# Patient Record
Sex: FEMALE | Race: WHITE | HISPANIC OR LATINO | Employment: UNEMPLOYED | URBAN - METROPOLITAN AREA
[De-identification: names, ages, dates, MRNs, and addresses within clinical notes are randomized per-mention and may not be internally consistent; named-entity substitution may affect disease eponyms.]

---

## 2019-07-01 ENCOUNTER — HOSPITAL ENCOUNTER (OUTPATIENT)
Facility: HOSPITAL | Age: 39
Discharge: HOME | End: 2019-07-02
Attending: EMERGENCY MEDICINE | Admitting: INTERNAL MEDICINE

## 2019-07-01 DIAGNOSIS — E66.9 OBESITY (BMI 30-39.9): ICD-10-CM

## 2019-07-01 DIAGNOSIS — K81.9 CHOLECYSTITIS: ICD-10-CM

## 2019-07-01 DIAGNOSIS — D72.829 LEUKOCYTOSIS, UNSPECIFIED TYPE: Primary | ICD-10-CM

## 2019-07-01 DIAGNOSIS — R11.10 VOMITING: ICD-10-CM

## 2019-07-01 LAB
ALBUMIN SERPL BCP-MCNC: 4.4 G/DL (ref 3.5–5.2)
ALP SERPL-CCNC: 112 U/L (ref 55–135)
ALT SERPL W/O P-5'-P-CCNC: 32 U/L (ref 10–44)
AMPHET+METHAMPHET UR QL: NEGATIVE
ANION GAP SERPL CALC-SCNC: 14 MMOL/L (ref 8–16)
AST SERPL-CCNC: 21 U/L (ref 10–40)
B-HCG UR QL: NEGATIVE
BACTERIA #/AREA URNS HPF: ABNORMAL /HPF
BARBITURATES UR QL SCN>200 NG/ML: NEGATIVE
BASOPHILS # BLD AUTO: 0.02 K/UL (ref 0–0.2)
BASOPHILS NFR BLD: 0.1 % (ref 0–1.9)
BENZODIAZ UR QL SCN>200 NG/ML: NEGATIVE
BILIRUB SERPL-MCNC: 0.3 MG/DL (ref 0.1–1)
BILIRUB UR QL STRIP: NEGATIVE
BUN SERPL-MCNC: 15 MG/DL (ref 6–20)
BZE UR QL SCN: NEGATIVE
CALCIUM SERPL-MCNC: 9.5 MG/DL (ref 8.7–10.5)
CANNABINOIDS UR QL SCN: NEGATIVE
CHLORIDE SERPL-SCNC: 108 MMOL/L (ref 95–110)
CLARITY UR: ABNORMAL
CO2 SERPL-SCNC: 20 MMOL/L (ref 23–29)
COLOR UR: ABNORMAL
CREAT SERPL-MCNC: 0.8 MG/DL (ref 0.5–1.4)
CREAT UR-MCNC: 371.3 MG/DL (ref 15–325)
CTP QC/QA: YES
DIFFERENTIAL METHOD: ABNORMAL
EOSINOPHIL # BLD AUTO: 0 K/UL (ref 0–0.5)
EOSINOPHIL NFR BLD: 0.1 % (ref 0–8)
ERYTHROCYTE [DISTWIDTH] IN BLOOD BY AUTOMATED COUNT: 13.8 % (ref 11.5–14.5)
EST. GFR  (AFRICAN AMERICAN): >60 ML/MIN/1.73 M^2
EST. GFR  (NON AFRICAN AMERICAN): >60 ML/MIN/1.73 M^2
GLUCOSE SERPL-MCNC: 125 MG/DL (ref 70–110)
GLUCOSE UR QL STRIP: NEGATIVE
HCT VFR BLD AUTO: 45.3 % (ref 37–48.5)
HGB BLD-MCNC: 14.7 G/DL (ref 12–16)
HGB UR QL STRIP: ABNORMAL
HYALINE CASTS #/AREA URNS LPF: 0 /LPF
KETONES UR QL STRIP: ABNORMAL
LEUKOCYTE ESTERASE UR QL STRIP: ABNORMAL
LIPASE SERPL-CCNC: 25 U/L (ref 4–60)
LYMPHOCYTES # BLD AUTO: 1.3 K/UL (ref 1–4.8)
LYMPHOCYTES NFR BLD: 5.2 % (ref 18–48)
MCH RBC QN AUTO: 28.4 PG (ref 27–31)
MCHC RBC AUTO-ENTMCNC: 32.5 G/DL (ref 32–36)
MCV RBC AUTO: 88 FL (ref 82–98)
METHADONE UR QL SCN>300 NG/ML: NEGATIVE
MICROSCOPIC COMMENT: ABNORMAL
MONOCYTES # BLD AUTO: 2.4 K/UL (ref 0.3–1)
MONOCYTES NFR BLD: 9.3 % (ref 4–15)
NEUTROPHILS # BLD AUTO: 21.6 K/UL (ref 1.8–7.7)
NEUTROPHILS NFR BLD: 85.9 % (ref 38–73)
NITRITE UR QL STRIP: NEGATIVE
OPIATES UR QL SCN: NEGATIVE
PCP UR QL SCN>25 NG/ML: NEGATIVE
PH UR STRIP: 5 [PH] (ref 5–8)
PLATELET # BLD AUTO: 423 K/UL (ref 150–350)
PMV BLD AUTO: 10 FL (ref 9.2–12.9)
POTASSIUM SERPL-SCNC: 3.4 MMOL/L (ref 3.5–5.1)
PROT SERPL-MCNC: 8.6 G/DL (ref 6–8.4)
PROT UR QL STRIP: ABNORMAL
RBC # BLD AUTO: 5.17 M/UL (ref 4–5.4)
RBC #/AREA URNS HPF: 3 /HPF (ref 0–4)
SODIUM SERPL-SCNC: 142 MMOL/L (ref 136–145)
SP GR UR STRIP: >1.03 (ref 1–1.03)
SQUAMOUS #/AREA URNS HPF: 10 /HPF
TOXICOLOGY INFORMATION: ABNORMAL
URN SPEC COLLECT METH UR: ABNORMAL
UROBILINOGEN UR STRIP-ACNC: NEGATIVE EU/DL
WBC # BLD AUTO: 25.36 K/UL (ref 3.9–12.7)
WBC #/AREA URNS HPF: 4 /HPF (ref 0–5)

## 2019-07-01 PROCEDURE — 83690 ASSAY OF LIPASE: CPT

## 2019-07-01 PROCEDURE — 25000003 PHARM REV CODE 250: Performed by: EMERGENCY MEDICINE

## 2019-07-01 PROCEDURE — 80053 COMPREHEN METABOLIC PANEL: CPT

## 2019-07-01 PROCEDURE — 81000 URINALYSIS NONAUTO W/SCOPE: CPT | Mod: 59

## 2019-07-01 PROCEDURE — 85025 COMPLETE CBC W/AUTO DIFF WBC: CPT

## 2019-07-01 PROCEDURE — 63600175 PHARM REV CODE 636 W HCPCS: Performed by: EMERGENCY MEDICINE

## 2019-07-01 PROCEDURE — 99285 EMERGENCY DEPT VISIT HI MDM: CPT | Mod: 25

## 2019-07-01 PROCEDURE — 96375 TX/PRO/DX INJ NEW DRUG ADDON: CPT

## 2019-07-01 PROCEDURE — 96365 THER/PROPH/DIAG IV INF INIT: CPT

## 2019-07-01 PROCEDURE — G0378 HOSPITAL OBSERVATION PER HR: HCPCS

## 2019-07-01 PROCEDURE — 96361 HYDRATE IV INFUSION ADD-ON: CPT

## 2019-07-01 PROCEDURE — S0028 INJECTION, FAMOTIDINE, 20 MG: HCPCS | Performed by: EMERGENCY MEDICINE

## 2019-07-01 PROCEDURE — 81025 URINE PREGNANCY TEST: CPT | Performed by: EMERGENCY MEDICINE

## 2019-07-01 PROCEDURE — 80307 DRUG TEST PRSMV CHEM ANLYZR: CPT

## 2019-07-01 RX ORDER — METOCLOPRAMIDE HYDROCHLORIDE 5 MG/ML
10 INJECTION INTRAMUSCULAR; INTRAVENOUS
Status: COMPLETED | OUTPATIENT
Start: 2019-07-01 | End: 2019-07-01

## 2019-07-01 RX ORDER — CIPROFLOXACIN 2 MG/ML
400 INJECTION, SOLUTION INTRAVENOUS
Status: DISCONTINUED | OUTPATIENT
Start: 2019-07-01 | End: 2019-07-02 | Stop reason: HOSPADM

## 2019-07-01 RX ORDER — MORPHINE SULFATE 10 MG/ML
4 INJECTION INTRAMUSCULAR; INTRAVENOUS; SUBCUTANEOUS EVERY 6 HOURS PRN
Status: DISCONTINUED | OUTPATIENT
Start: 2019-07-01 | End: 2019-07-02 | Stop reason: HOSPADM

## 2019-07-01 RX ORDER — FAMOTIDINE 10 MG/ML
20 INJECTION INTRAVENOUS
Status: COMPLETED | OUTPATIENT
Start: 2019-07-01 | End: 2019-07-01

## 2019-07-01 RX ORDER — METRONIDAZOLE 500 MG/100ML
500 INJECTION, SOLUTION INTRAVENOUS
Status: DISCONTINUED | OUTPATIENT
Start: 2019-07-01 | End: 2019-07-02 | Stop reason: HOSPADM

## 2019-07-01 RX ORDER — DIPHENHYDRAMINE HYDROCHLORIDE 50 MG/ML
50 INJECTION INTRAMUSCULAR; INTRAVENOUS
Status: COMPLETED | OUTPATIENT
Start: 2019-07-01 | End: 2019-07-01

## 2019-07-01 RX ORDER — SODIUM CHLORIDE 9 MG/ML
1000 INJECTION, SOLUTION INTRAVENOUS
Status: COMPLETED | OUTPATIENT
Start: 2019-07-01 | End: 2019-07-01

## 2019-07-01 RX ORDER — MORPHINE SULFATE 10 MG/ML
4 INJECTION INTRAMUSCULAR; INTRAVENOUS; SUBCUTANEOUS
Status: COMPLETED | OUTPATIENT
Start: 2019-07-01 | End: 2019-07-01

## 2019-07-01 RX ADMIN — FAMOTIDINE 20 MG: 10 INJECTION INTRAVENOUS at 10:07

## 2019-07-01 RX ADMIN — MORPHINE SULFATE 4 MG: 10 INJECTION INTRAVENOUS at 09:07

## 2019-07-01 RX ADMIN — DIPHENHYDRAMINE HYDROCHLORIDE 50 MG: 50 INJECTION INTRAMUSCULAR; INTRAVENOUS at 10:07

## 2019-07-01 RX ADMIN — PIPERACILLIN AND TAZOBACTAM 4.5 G: 4; .5 INJECTION, POWDER, LYOPHILIZED, FOR SOLUTION INTRAVENOUS; PARENTERAL at 09:07

## 2019-07-01 RX ADMIN — SODIUM CHLORIDE 1000 ML: 0.9 INJECTION, SOLUTION INTRAVENOUS at 09:07

## 2019-07-01 RX ADMIN — METOCLOPRAMIDE 10 MG: 5 INJECTION, SOLUTION INTRAMUSCULAR; INTRAVENOUS at 09:07

## 2019-07-01 RX ADMIN — SODIUM CHLORIDE 1000 ML: 0.9 INJECTION, SOLUTION INTRAVENOUS at 10:07

## 2019-07-01 RX ADMIN — CIPROFLOXACIN 400 MG: 2 INJECTION, SOLUTION INTRAVENOUS at 10:07

## 2019-07-02 VITALS
DIASTOLIC BLOOD PRESSURE: 71 MMHG | BODY MASS INDEX: 34.66 KG/M2 | TEMPERATURE: 98 F | HEIGHT: 60 IN | HEART RATE: 79 BPM | OXYGEN SATURATION: 98 % | SYSTOLIC BLOOD PRESSURE: 143 MMHG | WEIGHT: 176.56 LBS | RESPIRATION RATE: 18 BRPM

## 2019-07-02 PROBLEM — E66.9 OBESITY (BMI 30-39.9): Status: ACTIVE | Noted: 2019-07-02

## 2019-07-02 PROBLEM — D72.829 LEUKOCYTOSIS: Status: ACTIVE | Noted: 2019-07-02

## 2019-07-02 LAB
BASOPHILS # BLD AUTO: 0.01 K/UL (ref 0–0.2)
BASOPHILS NFR BLD: 0.1 % (ref 0–1.9)
DIFFERENTIAL METHOD: ABNORMAL
EOSINOPHIL # BLD AUTO: 0.1 K/UL (ref 0–0.5)
EOSINOPHIL NFR BLD: 0.6 % (ref 0–8)
ERYTHROCYTE [DISTWIDTH] IN BLOOD BY AUTOMATED COUNT: 14 % (ref 11.5–14.5)
HCT VFR BLD AUTO: 39.5 % (ref 37–48.5)
HGB BLD-MCNC: 12.6 G/DL (ref 12–16)
LYMPHOCYTES # BLD AUTO: 3 K/UL (ref 1–4.8)
LYMPHOCYTES NFR BLD: 25.3 % (ref 18–48)
MCH RBC QN AUTO: 28.1 PG (ref 27–31)
MCHC RBC AUTO-ENTMCNC: 31.9 G/DL (ref 32–36)
MCV RBC AUTO: 88 FL (ref 82–98)
MONOCYTES # BLD AUTO: 0.8 K/UL (ref 0.3–1)
MONOCYTES NFR BLD: 6.5 % (ref 4–15)
NEUTROPHILS # BLD AUTO: 7.9 K/UL (ref 1.8–7.7)
NEUTROPHILS NFR BLD: 67.8 % (ref 38–73)
PLATELET # BLD AUTO: 369 K/UL (ref 150–350)
PMV BLD AUTO: 9.8 FL (ref 9.2–12.9)
RBC # BLD AUTO: 4.48 M/UL (ref 4–5.4)
WBC # BLD AUTO: 11.66 K/UL (ref 3.9–12.7)

## 2019-07-02 PROCEDURE — 25000003 PHARM REV CODE 250: Performed by: EMERGENCY MEDICINE

## 2019-07-02 PROCEDURE — 94761 N-INVAS EAR/PLS OXIMETRY MLT: CPT

## 2019-07-02 PROCEDURE — 36415 COLL VENOUS BLD VENIPUNCTURE: CPT

## 2019-07-02 PROCEDURE — 85025 COMPLETE CBC W/AUTO DIFF WBC: CPT

## 2019-07-02 PROCEDURE — S0030 INJECTION, METRONIDAZOLE: HCPCS | Performed by: EMERGENCY MEDICINE

## 2019-07-02 PROCEDURE — G0378 HOSPITAL OBSERVATION PER HR: HCPCS

## 2019-07-02 PROCEDURE — 96375 TX/PRO/DX INJ NEW DRUG ADDON: CPT

## 2019-07-02 PROCEDURE — 63600175 PHARM REV CODE 636 W HCPCS: Performed by: EMERGENCY MEDICINE

## 2019-07-02 PROCEDURE — 96376 TX/PRO/DX INJ SAME DRUG ADON: CPT

## 2019-07-02 RX ORDER — METRONIDAZOLE 500 MG/1
500 TABLET ORAL EVERY 12 HOURS
Qty: 14 TABLET | Refills: 0 | Status: SHIPPED | OUTPATIENT
Start: 2019-07-02 | End: 2019-07-09

## 2019-07-02 RX ORDER — SODIUM CHLORIDE 0.9 % (FLUSH) 0.9 %
10 SYRINGE (ML) INJECTION
Status: DISCONTINUED | OUTPATIENT
Start: 2019-07-02 | End: 2019-07-02 | Stop reason: HOSPADM

## 2019-07-02 RX ADMIN — CIPROFLOXACIN 400 MG: 2 INJECTION, SOLUTION INTRAVENOUS at 11:07

## 2019-07-02 RX ADMIN — METRONIDAZOLE 500 MG: 500 INJECTION, SOLUTION INTRAVENOUS at 12:07

## 2019-07-02 RX ADMIN — METRONIDAZOLE 500 MG: 500 INJECTION, SOLUTION INTRAVENOUS at 08:07

## 2019-07-02 NOTE — ASSESSMENT & PLAN NOTE
Unclear etiology, infectious vs reactive from [place] - patient denies hemoptysis or hemetemesis - she is afebrile and feeling better this morning;   -recheck her CBC  -continue IV fluids

## 2019-07-02 NOTE — H&P
[facility] Medicine  History & Physical    Patient Name: Britany Stringer  MRN: [id number]  Admission Date: 7/1/2019  Attending Physician: Kami Ly MD   Primary Care Provider: Primary Doctor No         Patient information was obtained from patient and ER records.     Subjective:     Principal Problem:Cholecystitis    Chief Complaint:   Chief Complaint   Patient presents with    Abdominal Pain     pt complains of abd pain since 1600 hrs today. states has vomited about 12 times. states pain is epigastric and throat        HPI: Britany Stringer is a 39 y.o. with no past medical history who presented for evaluation of acute onset of sharp cramping abdominal pain 10/10 and >15 episodes of non-bloody emesis which began about 4p prior to admit. She does report mild diarrhea but reports resolve. She denies any similar episodes in the past. She states that her last meal was tortillia. Patient denies recent trauma, cp, fever, HA. Patient speaks Romansh and interview held using .    Initial workup showed elevated white count 25K, hypokalemia (mild), US abdomen showed Gallbladder impacted with stones with pericholecystic fluid and gallbladder wall thickening.     History reviewed. No pertinent past medical history.    History reviewed. No pertinent surgical history.    Review of patient's allergies indicates:   Allergen Reactions    Zosyn [piperacillin-tazobactam] Hives and Itching       No current facility-administered medications on file prior to encounter.      No current outpatient medications on file prior to encounter.     Family History     None        Tobacco Use    Smoking status: Never Smoker    Smokeless tobacco: Never Used   Substance and Sexual Activity    Alcohol use: Not Currently    Drug use: Never    Sexual activity: Not on file     Review of Systems   Constitutional: Negative for appetite change, chills, diaphoresis and fever.   HENT: Negative for congestion,  hearing loss, sore throat, tinnitus and trouble swallowing.    Eyes: Negative for photophobia, discharge, itching and visual disturbance.   Respiratory: Negative for apnea, cough, wheezing and stridor.    Cardiovascular: Negative for chest pain, palpitations and leg swelling.   Gastrointestinal: Positive for abdominal pain, diarrhea and nausea. Negative for abdominal distention, blood in stool and constipation.   Endocrine: Negative for polydipsia, polyphagia and polyuria.   Genitourinary: Negative for difficulty urinating, dysuria, flank pain and frequency.   Musculoskeletal: Negative for arthralgias, joint swelling and neck stiffness.   Skin: Negative for color change, rash and wound.   Neurological: Negative for dizziness, tremors, seizures, light-headedness, numbness and headaches.   Hematological: Negative for adenopathy.   Psychiatric/Behavioral: Negative for hallucinations and self-injury.     Objective:     Vital Signs (Most Recent):  Temp: 98.1 °F (36.7 °C) (07/02/19 0721)  Pulse: 81 (07/02/19 0721)  Resp: 18 (07/02/19 0721)  BP: 129/71 (07/02/19 0721)  SpO2: 98 % (07/02/19 0721) Vital Signs (24h Range):  Temp:  [97.8 °F (36.6 °C)-98.7 °F (37.1 °C)] 98.1 °F (36.7 °C)  Pulse:  [] 81  Resp:  [18] 18  SpO2:  [98 %-100 %] 98 %  BP: (126-169)/() 129/71     Weight: 80.1 kg (176 lb 9.4 oz)  Body mass index is 34.49 kg/m².    Physical Exam   Constitutional: She appears well-developed and well-nourished. She is cooperative.   HENT:   Head: Normocephalic and atraumatic.   Eyes: Conjunctivae and lids are normal.   Neck: Full passive range of motion without pain. Neck supple. No JVD present. No edema present. No thyroid mass present.   Cardiovascular: S1 normal, S2 normal and intact distal pulses.   No murmur heard.  Abdominal: Soft. Bowel sounds are normal. She exhibits no distension and no abdominal bruit. There is no splenomegaly or hepatomegaly. There is no tenderness. There is no CVA tenderness.    Lymphadenopathy:     She has no cervical adenopathy.     She has no axillary adenopathy.   Neurological: She is alert. She has normal reflexes. She displays no tremor. She displays no seizure activity.   Skin: Skin is warm, dry and intact.   Psychiatric: She has a normal mood and affect. Her speech is normal. Thought content normal. Cognition and memory are normal.           Significant Labs:   CBC:   Recent Labs   Lab 07/01/19 2057   WBC 25.36*   HGB 14.7   HCT 45.3   *     CMP:   Recent Labs   Lab 07/01/19 2057      K 3.4*      CO2 20*   *   BUN 15   CREATININE 0.8   CALCIUM 9.5   PROT 8.6*   ALBUMIN 4.4   BILITOT 0.3   ALKPHOS 112   AST 21   ALT 32   ANIONGAP 14   EGFRNONAA >60     Cardiac Markers: No results for input(s): CKMB, MYOGLOBIN, BNP, TROPISTAT in the last 48 hours.  Coagulation: No results for input(s): PT, INR, APTT in the last 48 hours.  Lactic Acid: No results for input(s): LACTATE in the last 48 hours.  Lipase:   Recent Labs   Lab 07/01/19 2057   LIPASE 25     Lipid Panel: No results for input(s): CHOL, HDL, LDLCALC, TRIG, CHOLHDL in the last 48 hours.  Magnesium: No results for input(s): MG in the last 48 hours.  Pathology Results  (Last 10 years)    None        Troponin: No results for input(s): TROPONINI in the last 48 hours.  TSH: No results for input(s): TSH in the last 4320 hours.  Urine Culture: No results for input(s): LABURIN in the last 48 hours.  Urine Studies:   Recent Labs   Lab 07/01/19  2100   COLORU Karissa   APPEARANCEUA Cloudy*   PHUR 5.0   SPECGRAV >1.030*   PROTEINUA 2+*   GLUCUA Negative   KETONESU Trace*   BILIRUBINUA Negative   OCCULTUA 1+*   NITRITE Negative   UROBILINOGEN Negative   LEUKOCYTESUR 1+*   RBCUA 3   WBCUA 4   BACTERIA Moderate*   SQUAMEPITHEL 10   HYALINECASTS 0       Significant Imaging:   Imaging Results          X-Ray Chest AP Portable (Final result)  Result time 07/01/19 22:21:02    Final result by Jillian Davies MD  (07/01/19 22:21:02)                 Impression:      No acute intrathoracic abnormality detected.      Electronically signed by: Jillian Davies  Date:    07/01/2019  Time:    22:21             Narrative:    EXAMINATION:  AP PORTABLE CHEST    CLINICAL HISTORY:  Vomiting, unspecified    TECHNIQUE:  AP portable chest radiograph was submitted.    COMPARISON:  None.    FINDINGS:  AP portable chest radiograph demonstrates a cardiac silhouette within normal limits.  There is no focal consolidation, pneumothorax, or pleural effusion.                                US Abdomen Limited (Final result)  Result time 07/01/19 21:55:13    Final result by Mukesh Ribeiro MD (07/01/19 21:55:13)                 Impression:      Gallbladder impacted with stones with pericholecystic fluid and gallbladder wall thickening.  Negative sonographic Bergeron's sign.  The findings are equivocal for acute cholecystitis. A HIDA scan may be obtained for further evaluation.    This report was flagged in Epic as abnormal.      Electronically signed by: Mukesh Ribeiro MD  Date:    07/01/2019  Time:    21:55             Narrative:    EXAMINATION:  US ABDOMEN LIMITED    CLINICAL HISTORY:  Epigastric pain and vomiting;    TECHNIQUE:  Limited ultrasound of the right upper quadrant of the abdomen (including pancreas, liver, gallbladder, common bile duct, and spleen) was performed.    COMPARISON:  None.    FINDINGS:  The pancreas is not visualized due to overlying bowel gas.    The gallbladder is impacted with multiple gallstones.  The gallbladder wall is thickened measuring 6 mm.  There is small amount of pericholecystic fluid.  Sonographic Bergeron's sign is negative.  There is no evidence of gallbladder wall hyperemia.    The CBD is within normal limits measuring 2.5 mm.  There is no evidence of intrahepatic biliary dilatation.    The visualized portions of the right kidney are unremarkable.                                  Assessment/Plan:     *  Cholecystitis  Abdominal pain with US shows pericholecystic fluid and gallbladder wall thickening and gallbladder impacted with stones. Surgery was consulted - Hepatic panel promising, afebrile --  White count at the time of admission elevated at 21K---r Awaiting surgery opinion  -HIDA pending  -surgery following  -if HIDA shows no cholecystitis will attempt clears and advance diet    Leukocytosis  Unclear etiology, infectious vs reactive from [place] - patient denies hemoptysis or hemetemesis - she is afebrile and feeling better this morning;   -recheck her CBC  -continue IV fluids      Obesity (BMI 30-39.9)  Risk factor for gallbladder disease - encourage lifestyle changes  Body mass index is 34.49 kg/m².  Once diet advanced will instruct 1800 ADA        VTE Risk Mitigation (From admission, onward)        Ordered     IP VTE HIGH RISK PATIENT  Once      07/02/19 0049     Place ASHLEE hose  Until discontinued      07/02/19 0049     Place sequential compression device  Until discontinued      07/02/19 0049           MOSHE Pizarro, FNP-C  Hospitalist - Department of Hospital Medicine  [address] [place] [address]  Office [phone]; Pager [phone]

## 2019-07-02 NOTE — CONSULTS
[facility] History and Physicial   07/02/2019 11:45 AM     Britany Stringer  [id number]    CC/Reason for Consultation: possible cholecystitis    HPI:  39 y.o. female with no significant history who presented to the ED yesterday with complains of abdominal pain. Patient reports pain started around 4 pm yesterday, was epigastric and radiated up her chest. She reports nausea and associated emesis, no hematemesis noted. She received a pill en route to the ED and her pain resolved. She reports no further pain since admission. She has never had this pain before. Denies fevers, chills.     History reviewed. No pertinent past medical history.    History reviewed. No pertinent surgical history.    Social History     Socioeconomic History    Marital status: Single     Spouse name: Not on file    Number of children: Not on file    Years of education: Not on file    Highest education level: Not on file   Occupational History    Not on file   Social Needs    Financial resource strain: Not on file    Food insecurity:     Worry: Not on file     Inability: Not on file    Transportation needs:     Medical: Not on file     Non-medical: Not on file   Tobacco Use    Smoking status: Never Smoker    Smokeless tobacco: Never Used   Substance and Sexual Activity    Alcohol use: Not Currently    Drug use: Never    Sexual activity: Not on file   Lifestyle    Physical activity:     Days per week: Not on file     Minutes per session: Not on file    Stress: Not on file   Relationships    Social connections:     Talks on phone: Not on file     Gets together: Not on file     Attends Tenri[employer] service: Not on file     Active member of club or organization: Not on file     Attends meetings of clubs or organizations: Not on file     Relationship status: Not on file   Other Topics Concern    Not on file   Social History Narrative    Not on file       History reviewed. No pertinent family history.    Review of patient's  allergies indicates:   Allergen Reactions    Zosyn [piperacillin-tazobactam] Hives and Itching       ROS As per HPI otherwise complete ROS negative    Objective    Vitals:    07/02/19 1136   BP: (!) 117/56   Pulse: 75   Resp: 18   Temp: 98 °F (36.7 °C)         GEN: Awake, alert, resting comfortably in bed   HEENT: NCAT, anicteric  RESP: Normal WOB, no distress  CV: RR, no murmur  ABD: Soft, NTND, no guarding/rebound, no scars  EXT: WWP, no edema  SKIN: warm, dry  NEURO: alert, normal speech  PSYCH: normal mood and affect    Lab Results   Component Value Date    WBC 11.66 07/02/2019    HGB 12.6 07/02/2019    HCT 39.5 07/02/2019    MCV 88 07/02/2019     (H) 07/02/2019       CMP  Sodium   Date Value Ref Range Status   07/01/2019 142 136 - 145 mmol/L Final     Potassium   Date Value Ref Range Status   07/01/2019 3.4 (L) 3.5 - 5.1 mmol/L Final     Chloride   Date Value Ref Range Status   07/01/2019 108 95 - 110 mmol/L Final     CO2   Date Value Ref Range Status   07/01/2019 20 (L) 23 - 29 mmol/L Final     Glucose   Date Value Ref Range Status   07/01/2019 125 (H) 70 - 110 mg/dL Final     BUN, Bld   Date Value Ref Range Status   07/01/2019 15 6 - 20 mg/dL Final     Creatinine   Date Value Ref Range Status   07/01/2019 0.8 0.5 - 1.4 mg/dL Final     Calcium   Date Value Ref Range Status   07/01/2019 9.5 8.7 - 10.5 mg/dL Final     Total Protein   Date Value Ref Range Status   07/01/2019 8.6 (H) 6.0 - 8.4 g/dL Final     Albumin   Date Value Ref Range Status   07/01/2019 4.4 3.5 - 5.2 g/dL Final     Total Bilirubin   Date Value Ref Range Status   07/01/2019 0.3 0.1 - 1.0 mg/dL Final     Comment:     For infants and newborns, interpretation of results should be based  on gestational age, weight and in agreement with clinical  observations.  Premature Infant recommended reference ranges:  Up to 24 hours.............<8.0 mg/dL  Up to 48 hours............<12.0 mg/dL  3-5 days..................<15.0 mg/dL  6-29  days.................<15.0 mg/dL       Alkaline Phosphatase   Date Value Ref Range Status   07/01/2019 112 55 - 135 U/L Final     AST   Date Value Ref Range Status   07/01/2019 21 10 - 40 U/L Final     ALT   Date Value Ref Range Status   07/01/2019 32 10 - 44 U/L Final     Anion Gap   Date Value Ref Range Status   07/01/2019 14 8 - 16 mmol/L Final     eGFR if    Date Value Ref Range Status   07/01/2019 >60 >60 mL/min/1.73 m^2 Final     eGFR if non    Date Value Ref Range Status   07/01/2019 >60 >60 mL/min/1.73 m^2 Final     Comment:     Calculation used to obtain the estimated glomerular filtration  rate (eGFR) is the CKD-EPI equation.        Imaging Results          X-Ray Chest AP Portable (Final result)  Result time 07/01/19 22:21:02    Final result by Jillian Davies MD (07/01/19 22:21:02)                 Impression:      No acute intrathoracic abnormality detected.      Electronically signed by: Jillian Davies  Date:    07/01/2019  Time:    22:21             Narrative:    EXAMINATION:  AP PORTABLE CHEST    CLINICAL HISTORY:  Vomiting, unspecified    TECHNIQUE:  AP portable chest radiograph was submitted.    COMPARISON:  None.    FINDINGS:  AP portable chest radiograph demonstrates a cardiac silhouette within normal limits.  There is no focal consolidation, pneumothorax, or pleural effusion.                                US Abdomen Limited (Final result)  Result time 07/01/19 21:55:13    Final result by Mukesh Ribeiro MD (07/01/19 21:55:13)                 Impression:      Gallbladder impacted with stones with pericholecystic fluid and gallbladder wall thickening.  Negative sonographic Bergeron's sign.  The findings are equivocal for acute cholecystitis. A HIDA scan may be obtained for further evaluation.    This report was flagged in Epic as abnormal.      Electronically signed by: Mukesh Ribeiro MD  Date:    07/01/2019  Time:    21:55             Narrative:    EXAMINATION:  US  ABDOMEN LIMITED    CLINICAL HISTORY:  Epigastric pain and vomiting;    TECHNIQUE:  Limited ultrasound of the right upper quadrant of the abdomen (including pancreas, liver, gallbladder, common bile duct, and spleen) was performed.    COMPARISON:  None.    FINDINGS:  The pancreas is not visualized due to overlying bowel gas.    The gallbladder is impacted with multiple gallstones.  The gallbladder wall is thickened measuring 6 mm.  There is small amount of pericholecystic fluid.  Sonographic Bergeron's sign is negative.  There is no evidence of gallbladder wall hyperemia.    The CBD is within normal limits measuring 2.5 mm.  There is no evidence of intrahepatic biliary dilatation.    The visualized portions of the right kidney are unremarkable.                                    A/P: 39 y.o. female with epigastric pain radiating to her chest that improved yesterday. Was started on IV abx - unclear if the pain resolved before or after this. Also with a leukocytosis of 25 that improved to 11 overnight. Pain has completely resolved. RUQ US equivocal for acute cholecystitis. Agree with HIDA scan - if this is normal recommend advancing diet. If pain returns, please notify surgery as we will proceed with lap cholecystectomy. Otherwise, patient to return to clinic with Dr. Henry in 1-2 weeks to discuss elective cholecystectomy for likely symptomatic cholelithiasis.       JO Hernandez MD  [facility] Surgery PGY5

## 2019-07-02 NOTE — SUBJECTIVE & OBJECTIVE
History reviewed. No pertinent past medical history.    History reviewed. No pertinent surgical history.    Review of patient's allergies indicates:   Allergen Reactions    Zosyn [piperacillin-tazobactam] Hives and Itching       No current facility-administered medications on file prior to encounter.      No current outpatient medications on file prior to encounter.     Family History     None        Tobacco Use    Smoking status: Never Smoker    Smokeless tobacco: Never Used   Substance and Sexual Activity    Alcohol use: Not Currently    Drug use: Never    Sexual activity: Not on file     Review of Systems   Constitutional: Negative for appetite change, chills, diaphoresis and fever.   HENT: Negative for congestion, hearing loss, sore throat, tinnitus and trouble swallowing.    Eyes: Negative for photophobia, discharge, itching and visual disturbance.   Respiratory: Negative for apnea, cough, wheezing and stridor.    Cardiovascular: Negative for chest pain, palpitations and leg swelling.   Gastrointestinal: Positive for abdominal pain, diarrhea and nausea. Negative for abdominal distention, blood in stool and constipation.   Endocrine: Negative for polydipsia, polyphagia and polyuria.   Genitourinary: Negative for difficulty urinating, dysuria, flank pain and frequency.   Musculoskeletal: Negative for arthralgias, joint swelling and neck stiffness.   Skin: Negative for color change, rash and wound.   Neurological: Negative for dizziness, tremors, seizures, light-headedness, numbness and headaches.   Hematological: Negative for adenopathy.   Psychiatric/Behavioral: Negative for hallucinations and self-injury.     Objective:     Vital Signs (Most Recent):  Temp: 98.1 °F (36.7 °C) (07/02/19 0721)  Pulse: 81 (07/02/19 0721)  Resp: 18 (07/02/19 0721)  BP: 129/71 (07/02/19 0721)  SpO2: 98 % (07/02/19 0721) Vital Signs (24h Range):  Temp:  [97.8 °F (36.6 °C)-98.7 °F (37.1 °C)] 98.1 °F (36.7 °C)  Pulse:  []  81  Resp:  [18] 18  SpO2:  [98 %-100 %] 98 %  BP: (126-169)/() 129/71     Weight: 80.1 kg (176 lb 9.4 oz)  Body mass index is 34.49 kg/m².    Physical Exam   Constitutional: She appears well-developed and well-nourished. She is cooperative.   HENT:   Head: Normocephalic and atraumatic.   Eyes: Conjunctivae and lids are normal.   Neck: Full passive range of motion without pain. Neck supple. No JVD present. No edema present. No thyroid mass present.   Cardiovascular: S1 normal, S2 normal and intact distal pulses.   No murmur heard.  Abdominal: Soft. Bowel sounds are normal. She exhibits no distension and no abdominal bruit. There is no splenomegaly or hepatomegaly. There is no tenderness. There is no CVA tenderness.   Lymphadenopathy:     She has no cervical adenopathy.     She has no axillary adenopathy.   Neurological: She is alert. She has normal reflexes. She displays no tremor. She displays no seizure activity.   Skin: Skin is warm, dry and intact.   Psychiatric: She has a normal mood and affect. Her speech is normal. Thought content normal. Cognition and memory are normal.           Significant Labs:   CBC:   Recent Labs   Lab 07/01/19 2057   WBC 25.36*   HGB 14.7   HCT 45.3   *     CMP:   Recent Labs   Lab 07/01/19 2057      K 3.4*      CO2 20*   *   BUN 15   CREATININE 0.8   CALCIUM 9.5   PROT 8.6*   ALBUMIN 4.4   BILITOT 0.3   ALKPHOS 112   AST 21   ALT 32   ANIONGAP 14   EGFRNONAA >60     Cardiac Markers: No results for input(s): CKMB, MYOGLOBIN, BNP, TROPISTAT in the last 48 hours.  Coagulation: No results for input(s): PT, INR, APTT in the last 48 hours.  Lactic Acid: No results for input(s): LACTATE in the last 48 hours.  Lipase:   Recent Labs   Lab 07/01/19 2057   LIPASE 25     Lipid Panel: No results for input(s): CHOL, HDL, LDLCALC, TRIG, CHOLHDL in the last 48 hours.  Magnesium: No results for input(s): MG in the last 48 hours.  Pathology Results  (Last 10 years)     None        Troponin: No results for input(s): TROPONINI in the last 48 hours.  TSH: No results for input(s): TSH in the last 4320 hours.  Urine Culture: No results for input(s): LABURIN in the last 48 hours.  Urine Studies:   Recent Labs   Lab 07/01/19  2100   COLORU Karissa   APPEARANCEUA Cloudy*   PHUR 5.0   SPECGRAV >1.030*   PROTEINUA 2+*   GLUCUA Negative   KETONESU Trace*   BILIRUBINUA Negative   OCCULTUA 1+*   NITRITE Negative   UROBILINOGEN Negative   LEUKOCYTESUR 1+*   RBCUA 3   WBCUA 4   BACTERIA Moderate*   SQUAMEPITHEL 10   HYALINECASTS 0       Significant Imaging:   Imaging Results          X-Ray Chest AP Portable (Final result)  Result time 07/01/19 22:21:02    Final result by Jillian Davies MD (07/01/19 22:21:02)                 Impression:      No acute intrathoracic abnormality detected.      Electronically signed by: Jillian Davies  Date:    07/01/2019  Time:    22:21             Narrative:    EXAMINATION:  AP PORTABLE CHEST    CLINICAL HISTORY:  Vomiting, unspecified    TECHNIQUE:  AP portable chest radiograph was submitted.    COMPARISON:  None.    FINDINGS:  AP portable chest radiograph demonstrates a cardiac silhouette within normal limits.  There is no focal consolidation, pneumothorax, or pleural effusion.                                US Abdomen Limited (Final result)  Result time 07/01/19 21:55:13    Final result by Mukesh Ribeiro MD (07/01/19 21:55:13)                 Impression:      Gallbladder impacted with stones with pericholecystic fluid and gallbladder wall thickening.  Negative sonographic Bergeron's sign.  The findings are equivocal for acute cholecystitis. A HIDA scan may be obtained for further evaluation.    This report was flagged in Epic as abnormal.      Electronically signed by: Mukesh Ribeiro MD  Date:    07/01/2019  Time:    21:55             Narrative:    EXAMINATION:  US ABDOMEN LIMITED    CLINICAL HISTORY:  Epigastric pain and vomiting;    TECHNIQUE:  Limited  ultrasound of the right upper quadrant of the abdomen (including pancreas, liver, gallbladder, common bile duct, and spleen) was performed.    COMPARISON:  None.    FINDINGS:  The pancreas is not visualized due to overlying bowel gas.    The gallbladder is impacted with multiple gallstones.  The gallbladder wall is thickened measuring 6 mm.  There is small amount of pericholecystic fluid.  Sonographic Bergeron's sign is negative.  There is no evidence of gallbladder wall hyperemia.    The CBD is within normal limits measuring 2.5 mm.  There is no evidence of intrahepatic biliary dilatation.    The visualized portions of the right kidney are unremarkable.

## 2019-07-02 NOTE — ED PROVIDER NOTES
Encounter Date: 7/1/2019    SCRIBE #1 NOTE: I, Vivian Florian, am scribing for, and in the presence of,  Mirian Chand MD. I have scribed the following portions of the note - Other sections scribed: HPI, ROS, PE.       History     Chief Complaint   Patient presents with    Abdominal Pain     pt complains of abd pain since 1600 hrs today. states has vomited about 12 times. states pain is epigastric and throat     CC: Abdominal Pain     39 year old female has no past medical history on file presents to the ED via EMS for evaluation of emesis and epigastric pain that began at 2 pm today. Pt has not taken medicine. Pt does not smoke. Pt denies chest pain and bloody emesis. Pt does not have allergies. No other symptoms reported.     No sick contacts/ travel/ or  medications.  Denies marijuana.  Does not drink alcohol.  No other complaints at this time.  desire asa by ems   no cardiac rf or cp    The history is provided by the patient. No  was used.     Review of patient's allergies indicates:  No Known Allergies  History reviewed. No pertinent past medical history.  History reviewed. No pertinent surgical history.  No family history on file.  Social History     Tobacco Use    Smoking status: Not on file   Substance Use Topics    Alcohol use: Not on file    Drug use: Not on file     Review of Systems   Constitutional: Negative for appetite change and fever.   HENT: Negative for rhinorrhea and sore throat.    Eyes: Negative for visual disturbance.   Respiratory: Negative for cough and shortness of breath.    Cardiovascular: Negative for chest pain.   Gastrointestinal: Positive for abdominal pain (epigastric), nausea and vomiting.   Genitourinary: Negative for dysuria.   Musculoskeletal: Negative for gait problem.   Skin: Negative for rash.   Neurological: Negative for syncope.   All other systems reviewed and are negative.      Physical Exam     Initial Vitals [07/01/19 2023]   BP Pulse Resp Temp  SpO2   (!) 169/127 95 18 98.7 °F (37.1 °C) --      MAP       --         Physical Exam    Nursing note and vitals reviewed.  Constitutional: She appears well-developed and well-nourished. She is not diaphoretic. No distress.   HENT:   Head: Normocephalic and atraumatic.   Mouth/Throat: Oropharynx is clear and moist.   Eyes: EOM are normal. Pupils are equal, round, and reactive to light. No scleral icterus.   Neck: Normal range of motion. Neck supple. No JVD present.   Cardiovascular: Normal rate, regular rhythm, normal heart sounds and intact distal pulses.   Pulmonary/Chest: Breath sounds normal. No stridor. No respiratory distress.   Abdominal: Soft. Bowel sounds are normal. She exhibits no distension. There is no tenderness.   Musculoskeletal: Normal range of motion. She exhibits no edema or tenderness.   Neurological: She is alert and oriented to person, place, and time. She has normal strength. No cranial nerve deficit or sensory deficit.   Skin: Skin is warm and dry.   Psychiatric: She has a normal mood and affect.         ED Course   Procedures  Labs Reviewed   URINALYSIS, REFLEX TO URINE CULTURE   CBC W/ AUTO DIFFERENTIAL   COMPREHENSIVE METABOLIC PANEL   LIPASE   DRUG SCREEN PANEL, URINE EMERGENCY   POCT URINE PREGNANCY          Imaging Results    None                       Attending Attestation:             Attending ED Notes:   PT HAD HIVES DEVELOP AFTER ZOSYHN  NO AIRWAY ISSUES  I HAVE STOPEED ZOSYN  WILL CHANGE ABX             Clinical Impression: abdominal pain   No diagnosis found.                             Mirian Chand MD  07/01/19 2057       Mirian Chand MD  07/01/19 [id number]

## 2019-07-02 NOTE — PROGRESS NOTES
WRITTEN HEALTHCARE DISCHARGE INFORMATION      Things that YOU are RESPONSIBLE for to Manage Your Care At Home:     1. Getting your prescriptions filled.  2. Taking you medications as directed. DO NOT MISS ANY DOSES!  3. Going to your follow-up doctor appointments. This is important because it allows the doctor to monitor your progress and to determine if any changes need to be made to your treatment plan.     If you are unable to make your follow up appointments, please call the number listed and reschedule this appointment.      ____________HELP AT HOME____________________     Experiencing any SIGNS or SYMPTOMS: YOU CAN     Schedule a same day appopintment with your Primary Care Doctor or  you can call [facility] On Call Nurse Care Line for 24/7 assistance at [phone]     If you are experience any signs or symptoms that have become severe, Call 911 and come to your nearest Emergency Room.     Thank you for choosing [facility] and allowing us to care for you.   From your care management team: MARQUEZ Grady [phone]     You should receive a call from [facility] Discharge Department within 48-72 hours to help manage your care after discharge. Please try to make sure that you answer your phone for this important phone call.    Follow-up Information     [facility].    Why:  please call at time of discharge to schedule an appointment for follow up and to establish primary care services for future.  Contact information:  [address] [place] [id number]  [phone]

## 2019-07-02 NOTE — NURSING
With the assistance of language line; reviewed all discharge instructions with patient, new medications, continued medications, follow-up appointments and signs/symptoms to report to PCP or seek emergency medical care. Patient verbalized understanding to all instructions and education. Patient denies any complaints or concerns at this time. Patient waiting on ride home.

## 2019-07-02 NOTE — DISCHARGE INSTRUCTIONS
Regresa a la rashawn de emergencias si tiene dolor que empeora, si no puede comer sin náuseas y vómitos, o si tiene fiebre. (Return to the emergency department if you have worsening pain, if you can't eat without nausea/vomiting, or if you have a fever).

## 2019-07-02 NOTE — NURSING
In preparation for discharge, d/c'ed patient's saline lock, applied pressure to site, secured site with gauze and tape, no redness or swelling noted. Patient is sitting in room waiting for discharge instructions. NAD noted.

## 2019-07-02 NOTE — PLAN OF CARE
07/02/19 1033   Discharge Assessment   Assessment Type Discharge Planning Assessment   Assessment information obtained from? Medical Record;Patient   Prior to hospitilization cognitive status: Alert/Oriented   Prior to hospitalization functional status: Independent   Current cognitive status: Alert/Oriented   Current Functional Status: Independent   Facility Arrived From: home   Lives With significant other   Able to Return to Prior Arrangements yes   Is patient able to care for self after discharge? Yes   Who are your caregiver(s) and their phone number(s)? jose- [phone]   Patient's perception of discharge disposition home or selfcare   Readmission Within the Last 30 Days no previous admission in last 30 days   Patient currently being followed by outpatient case management? No   Patient currently receives any other outside agency services? No   Equipment Currently Used at Home none   Do you have any problems affording any of your prescribed medications? No   Is the patient taking medications as prescribed?   (n/a)   Does the patient have transportation home? No   Does the patient receive services at the [facility]? No   Discharge Plan A Home with family   Discharge Plan B Home with family  (with [facility] information to get follow up appointment and establish future care )   DME Needed Upon Discharge  none   Patient/Family in Agreement with Plan yes         Dolor Abdominal    El dolor abdominal es dolor en el vientre o en la tameka del intestino. Todo el shyanne tiene talib tipo de dolor de vez en cuando. En muchos casos el dolor desaparece por sí solo. Spike en ciertas ocasiones el dolor abdominal puede ser consecuencia de un problema grave, arron por ejemplo sagrario apendicitis. Por lo tanto, es importante saber cuándo se debe obtener ayuda.  Causas del dolor abdominal  El dolor abdominal puede tener muchas causas. Entre las causas más comunes, en los adultos, se encuentran las  siguientes:   · Estreñimiento, diarrea o gases  · Reflujo gastroesofágico (desplazamiento del ácido estomacal hacia el esófago, también llamado reflujo ácido o ardor estomacal)  · Úlcera péptica (lesión en el revestimiento interno del estómago o del intestino de jesus)  · Inflamación de la vesícula biliar, el hígado o el páncreas  · Cálculos biliares o renales  · Apendicitis  · Obstrucción del intestino  · Hernia (protrusión o abultamiento de un órgano interno a través de un músculo u otro tejido)  · Infecciones de las vías urinarias  · En las mujeres, cólicos menstruales, fibromas o endometriosis del útero  · Inflamación o infección del intestino  Diagnóstico de la causa del dolor abdominal  Degroot proveedor de atención médica le hará un examen para ayudar a determinar la causa de degroot dolor. En penny necesario, le harán ciertas pruebas. El dolor abdominal puede ser difícil de diagnosticar porque smooth causas pueden ser muy diversas. Los detalles que usted sepa loco acerca de degroot dolor pueden resultar útiles. Diga a degroot proveedor de atención médica dónde y cuándo siente el dolor y qué cosas lo alivian o lo empeoran. Mencione también si tiene otros síntomas arron fiebre, cansancio, náuseas, vómito o cambios en la frecuencia con que evacúa smooth intestinos o degroot vejiga.  Tratamiento del dolor abdominal  Ciertas causas de talib dolor, arron sagrario apendicitis o sagrario obstrucción intestinal, requieren tratamiento urgente. Otros problemas pueden tratarse mediante descanso, consumo de líquidos o medicamentos. Degroot proveedor de atención médica le dará instrucciones específicas para el tratamiento que debe seguir o cómo debe cuidarse según la causa de degroot dolor.   Si tiene vómito o diarrea, radha pequeños sorbos de agua u otros líquidos caro. Cuando esté listo para comer de nuevo alimentos sólidos, empiece comiendo pequeñas cantidades de cosas fáciles de digerir, arron puré de manzanas, pan dorie o galletas saladas.   Cuándo debe llamar al  médico  Llame al 911 o vaya al [facility] de inmediato si tiene alguno de los siguientes síntomas:  · No puede defecar y está vomitando  · Está vomitando bar o tiene diarrea de color negruzco o muy oscuro  · Tiene también dolor en el pecho, en el mary kate o en el hombro  · Siente que está a punto de desmayarse  · Tiene dolor en los omóplatos, con náuseas  · Tiene un dolor repentino e insoportable en el abdomen  · Tiene un nuevo dolor distinto de todos los mis que ha tenido antes  · Tiene el vientre rígido, jimi y sensible al tacto  Llame a degroot médico si tiene:  · Dolor jana más de 5 días  · Abotargamiento (sensación de llenura e inflamiento) jana más de 2 días  · Diarrea jana más de 5 días  · Fiebre superior a 101°F o más  · Dolor que continúa aumentando  · Pérdida de peso sin motivo aparente  · Falta de apetito persistente  · Bar en las heces  Cómo prevenir el dolor abdominal  Estos son algunos consejos para ayudar a prevenir el dolor abdominal:  · Coma cantidades más pequeñas de alimentos en cada comida.  · Evite los alimentos fritos o que contengan mucha grasa.  · Evite los alimentos que le producen gas.  · Kimberlyn ejercicio con regularidad.  · Yvonne abundantes líquidos.  Para ayudar a prevenir los síntomas del reflujo gastroesofágico:  · Deje de fumar.  · Yvonne menos alcohol y coma menos de esos alimentos que aumentan degroot acidez estomacal.  · Pierda el exceso de peso.  · Termine de comer arron mínimo 2 horas antes de acostarse.  · Eleve un poco la cabecera de degroot cama.

## 2019-07-02 NOTE — ASSESSMENT & PLAN NOTE
Risk factor for gallbladder disease - encourage lifestyle changes  Body mass index is 34.49 kg/m².  Once diet advanced will instruct 1800 ADA

## 2019-07-02 NOTE — PLAN OF CARE
TAMMIE reviewed - negative for acute cholecystitis.   OK for CLD, advance as tolerated  Notify surgery if pain worsens with diet as patient may need operative intervention if this occurs  Follow up with Dr. Henry to discuss elective lap cholecystectomy     Return to ED if worsening abdominal pain, fever, scleral icterus or jaundice, inability to tolerate PO.     M. Bozena Hernandez MD, PGY5

## 2019-07-02 NOTE — PLAN OF CARE
07/02/19 1506   Final Note   Assessment Type Final Discharge Note   Anticipated Discharge Disposition Home   Hospital Follow Up  Appt(s) scheduled? Yes   Discharge plans and expectations educations in teach back method with documentation complete?   (pt off unit at time will follow up at later time)   Right Care Referral Info   Post Acute Recommendation No Care   pts nurse Emely advised that all CM needs have been addressed and can d/c from CM standpoint.

## 2019-07-02 NOTE — ASSESSMENT & PLAN NOTE
Abdominal pain with US shows pericholecystic fluid and gallbladder wall thickening and gallbladder impacted with stones. Surgery was consulted - Hepatic panel promising, afebrile --  White count at the time of admission elevated at 21K---r Awaiting surgery opinion  -HIDA pending  -surgery following  -if HIDA shows no cholecystitis will attempt clears and advance diet

## 2019-07-02 NOTE — HPI
Britany Stringer is a 39 y.o. with no past medical history who presented for evaluation of acute onset of sharp cramping abdominal pain 10/10 and >15 episodes of non-bloody emesis which began about 4p prior to admit. She does report mild diarrhea but reports resolve. She denies any similar episodes in the past. She states that her last meal was tortillia. Patient denies recent trauma, cp, fever, HA. Patient speaks Hebrew and interview held using .    Initial workup showed elevated white count 25K, hypokalemia (mild), US abdomen showed Gallbladder impacted with stones with pericholecystic fluid and gallbladder wall thickening.

## 2019-07-03 NOTE — DISCHARGE SUMMARY
[facility] Medicine  Discharge Summary      Patient Name: Britany Stringer  MRN: [id number]  Admission Date: 7/1/2019  Hospital Length of Stay: 0 days  Discharge Date and Time:  07/02/2019 10:47 PM  Attending Physician: Hyun att. providers found   Discharging Provider: JACK Oneill  Primary Care Provider: Primary Doctor Hyun      HPI:   Britany Stringer is a 39 y.o. with no past medical history who presented for evaluation of acute onset of sharp cramping abdominal pain 10/10 and >15 episodes of non-bloody emesis which began about 4p prior to admit. She does report mild diarrhea but reports resolve. She denies any similar episodes in the past. She states that her last meal was tortillia. Patient denies recent trauma, cp, fever, HA. Patient speaks Polish and interview held using .    Initial workup showed elevated white count 25K, hypokalemia (mild), US abdomen showed Gallbladder impacted with stones with pericholecystic fluid and gallbladder wall thickening.     * No surgery found *      Hospital Course:   Patient was noted to have multiple gallstones in gallbladder and was placed in observation for pain control. She had hida that did not show cholecystitis or obstruction. Surgery did not recommend procedure. Her hepatic panel remained WNL, she was afebrile. She had white count thought to be reactive vs infectious. She was started on dual ABX in ed and white count normalized. In early morning the patient reported complete resolve of her symptoms. Meals advanced and able to tolerate her meals without issue. She was discharged to home. Instructed to seek elective surgery of gallbladder for removal. FU with primary or surgery if symptoms worsens or returns.     Consults:   Consults (From admission, onward)        Status Ordering Provider     Inpatient consult to General surgery  Once     Provider:  Joey Henry MD    Completed NIDIA MARCANO new Assessment & Plan  notes have been filed under this hospital service since the last note was generated.  Service: Hospital Medicine    Final Active Diagnoses:    Diagnosis Date Noted POA    PRINCIPAL PROBLEM:  Cholecystitis [K81.9] 07/01/2019 Yes    Leukocytosis [D72.829] 07/02/2019 Yes    Obesity (BMI 30-39.9) [E66.9] 07/02/2019 Yes      Problems Resolved During this Admission:       Discharged Condition: stable    Disposition: Home or Self Care    Follow Up:  Follow-up Information     [facility].    Why:  please call at time of discharge to schedule an appointment for follow up and to establish primary care services for future.  Contact information:  [address] [facility] [address]  [phone]             Joey Henry MD. Schedule an appointment as soon as possible for a visit in 1 week.    Specialties:  General Surgery, Oncology  Contact information:  [address]  [address]  [facility] [address]  [phone]                 Patient Instructions:      Diet Cardiac     Activity as tolerated       Significant Diagnostic Studies: Labs:   CMP   Recent Labs   Lab 07/01/19 2057      K 3.4*      CO2 20*   *   BUN 15   CREATININE 0.8   CALCIUM 9.5   PROT 8.6*   ALBUMIN 4.4   BILITOT 0.3   ALKPHOS 112   AST 21   ALT 32   ANIONGAP 14   ESTGFRAFRICA >60   EGFRNONAA >60   , CBC   Recent Labs   Lab 07/01/19 2057 07/02/19  1034   WBC 25.36* 11.66   HGB 14.7 12.6   HCT 45.3 39.5   * 369*   , INR No results found for: INR, PROTIME, Lipid Panel No results found for: CHOL, HDL, LDLCALC, TRIG, CHOLHDL, Troponin No results for input(s): TROPONINI in the last 168 hours. and A1C: No results for input(s): HGBA1C in the last 4320 hours.    Pending Diagnostic Studies:     None         Medications:  Reconciled Home Medications:      Medication List      START taking these medications    ciprofloxacin 500 mg tablet  Commonly known as:  CIPRO XR  Take 1 tablet (500 mg total) by mouth once daily. for 7  days     metroNIDAZOLE 500 MG tablet  Commonly known as:  FLAGYL  Take 1 tablet (500 mg total) by mouth every 12 (twelve) hours. for 7 days            Indwelling Lines/Drains at time of discharge:   Lines/Drains/Airways          None          Time spent on the discharge of patient: 45 minutes  Patient was seen and examined on the date of discharge and determined to be suitable for discharge.         MOSHE Pizarro, FNP-C  Hospitalist - Department of [facility] Medicine  [address] [place] [address]  Office [phone]; Pager [phone]

## 2019-07-03 NOTE — HOSPITAL COURSE
Patient was noted to have multiple gallstones in gallbladder and was placed in observation for pain control. She had hida that did not show cholecystitis or obstruction. Surgery did not recommend procedure. Her hepatic panel remained WNL, she was afebrile. She had white count thought to be reactive vs infectious. She was started on dual ABX in ed and white count normalized. In early morning the patient reported complete resolve of her symptoms. Meals advanced and able to tolerate her meals without issue. She was discharged to home. Instructed to seek elective surgery of gallbladder for removal. FU with primary or surgery if symptoms worsens or returns.

## 2024-11-02 NOTE — NURSING TRANSFER
Nursing Transfer Note      7/2/2019     Transfer To: 325 From: ED    Transfer via: Stretcher    Transfer with: Patient's belongings (shoes, purse, wallet)    Transported by: ED tech    Medicines sent: IV fluids    Chart send with patient: yes    Patient reassessed at: July 2, 2019 at 1220am    Upon arrival to floor patient scooted from stretcher to bed with no assistance. Vital signs obtained and found in flow sheets with complete patient assessment. Skin dry and intact with a 22g LH PIV noted with normal saline infusing at 100cc/hr. Admit assessment completed via MARTTI . Patient with no complaints of pain, no nausea or vomiting, and no signs of respiratory distress. General surgery consulted. Plan to continue IV fluids and IV antibiotics, monitor for nausea and vomiting, and manage pain. Patient will remain NPO pending evaluation from surgeon. Patient updated on plan of care and verbalized understanding. Call light in reach and patient instructed to inform the nurse if anything is needed. Call light in reach and patient instructed to inform the nurse if anything is needed.   
Opt out